# Patient Record
Sex: FEMALE | Race: WHITE | NOT HISPANIC OR LATINO
[De-identification: names, ages, dates, MRNs, and addresses within clinical notes are randomized per-mention and may not be internally consistent; named-entity substitution may affect disease eponyms.]

---

## 2018-04-12 ENCOUNTER — RX RENEWAL (OUTPATIENT)
Age: 68
End: 2018-04-12

## 2018-04-12 PROBLEM — Z00.00 ENCOUNTER FOR PREVENTIVE HEALTH EXAMINATION: Status: ACTIVE | Noted: 2018-04-12

## 2018-11-02 ENCOUNTER — RECORD ABSTRACTING (OUTPATIENT)
Age: 68
End: 2018-11-02

## 2018-11-02 DIAGNOSIS — Z82.49 FAMILY HISTORY OF ISCHEMIC HEART DISEASE AND OTHER DISEASES OF THE CIRCULATORY SYSTEM: ICD-10-CM

## 2018-11-02 DIAGNOSIS — Z83.3 FAMILY HISTORY OF DIABETES MELLITUS: ICD-10-CM

## 2018-11-02 RX ORDER — INSULIN ASPART 100 [IU]/ML
INJECTION, SOLUTION INTRAVENOUS; SUBCUTANEOUS
Refills: 0 | Status: ACTIVE | COMMUNITY

## 2018-11-02 RX ORDER — INSULIN GLARGINE 100 [IU]/ML
INJECTION, SOLUTION SUBCUTANEOUS
Refills: 0 | Status: ACTIVE | COMMUNITY

## 2018-11-26 DIAGNOSIS — Z86.39 PERSONAL HISTORY OF OTHER ENDOCRINE, NUTRITIONAL AND METABOLIC DISEASE: ICD-10-CM

## 2018-11-26 DIAGNOSIS — Z86.79 PERSONAL HISTORY OF OTHER DISEASES OF THE CIRCULATORY SYSTEM: ICD-10-CM

## 2018-11-26 DIAGNOSIS — Z87.440 PERSONAL HISTORY OF URINARY (TRACT) INFECTIONS: ICD-10-CM

## 2018-11-26 DIAGNOSIS — Z87.19 PERSONAL HISTORY OF OTHER DISEASES OF THE DIGESTIVE SYSTEM: ICD-10-CM

## 2018-11-30 ENCOUNTER — APPOINTMENT (OUTPATIENT)
Dept: CARDIOLOGY | Facility: CLINIC | Age: 68
End: 2018-11-30
Payer: MEDICARE

## 2018-11-30 VITALS
HEART RATE: 55 BPM | HEIGHT: 60 IN | WEIGHT: 125 LBS | DIASTOLIC BLOOD PRESSURE: 87 MMHG | OXYGEN SATURATION: 99 % | BODY MASS INDEX: 24.54 KG/M2 | TEMPERATURE: 97.9 F | SYSTOLIC BLOOD PRESSURE: 147 MMHG

## 2018-11-30 PROCEDURE — 93000 ELECTROCARDIOGRAM COMPLETE: CPT

## 2018-11-30 PROCEDURE — 99215 OFFICE O/P EST HI 40 MIN: CPT

## 2018-12-08 ENCOUNTER — NON-APPOINTMENT (OUTPATIENT)
Age: 68
End: 2018-12-08

## 2018-12-08 ENCOUNTER — RX RENEWAL (OUTPATIENT)
Age: 68
End: 2018-12-08

## 2018-12-08 RX ORDER — CARVEDILOL 3.12 MG/1
TABLET, FILM COATED ORAL
Refills: 0 | Status: ACTIVE | COMMUNITY

## 2018-12-08 RX ORDER — LISINOPRIL 30 MG/1
TABLET ORAL
Refills: 0 | Status: ACTIVE | COMMUNITY

## 2018-12-08 NOTE — HISTORY OF PRESENT ILLNESS
[FreeTextEntry1] : 68-year-old female\par Routine followup\par "I feel okay."Mrs Carty suffered trauma to her left leg which had limited her ability to exercise. Liz is now back exercising at her gym regularly. She denies chest pain, shortness of breath, palpitations orthopnea or syncope.

## 2018-12-08 NOTE — REVIEW OF SYSTEMS
[Feeling Fatigued] : feeling fatigued [Eyeglasses] : currently wearing eyeglasses [see HPI] : see HPI [Joint Pain] : joint pain [Negative] : Heme/Lymph

## 2018-12-08 NOTE — PHYSICAL EXAM
[Normal Conjunctiva] : the conjunctiva exhibited no abnormalities [Auscultation Breath Sounds / Voice Sounds] : lungs were clear to auscultation bilaterally [Heart Rate And Rhythm] : heart rate and rhythm were normal [Heart Sounds] : normal S1 and S2 [Murmurs] : no murmurs present [Edema] : no peripheral edema present [Bowel Sounds] : normal bowel sounds [Abnormal Walk] : normal gait [Cyanosis, Localized] : no localized cyanosis [] : no rash [Affect] : the affect was normal [FreeTextEntry1] : pleasant

## 2018-12-08 NOTE — DISCUSSION/SUMMARY
[FreeTextEntry1] : Atherosclerotic heart disease\par Atherosclerotic heart disease is stable. In 5/13 multivessel disease and depressed left ventricle systolic function lead to coronary artery bypass graft surgery. The operation consisted of the LIMA-LAD and SVG-OM1. The RCA was small and nondominant. The postoperative course was complicated by sternal wound infection requiring multiple debridement procedures and prolonged antibiotic therapy. A 7/17 stress sestamibi study was normal without any evidence of myocardial ischemia or infarction. The left ventricular ejection fraction was 69%. In view of the lack of symptoms, above noninvasive studies and clinical course continued medical management is indicated.\par \par I have recommended the following:\par Risk factor modification\par 2 continue the present medical regimen 3 echocardiogram with next office evaluation in 6 months.\par \par \par \par Cardiomyopathy.\par  The working diagnosis is a dilated ischemic-diabetic-hypertensive cardiomyopathy with resultant congestive heart failure now resolved. In 5/13  Liz  presented with congestive heart failure/pleural effusions. Severe left ventricular systolic dysfunction was reflected by a left ventricular ejection fraction of 30% on a 5/13 preoperative echocardiogram and 23% on intraoperative transesophageal echo cardiographic study. Subsequent to the 5/13 coronary artery bypass graft surgery there is marked improvement in left ventricular systolic performance. An  11/16 echocardiogram demonstrated a left ventricle ejection fraction of 68% with a normal end-diastolic dimension of 4.1 cm. The 7/17 gated sestamibi study revealed an ejection fraction of 69%. The present cardiac physical examination is indicative of a euvolemic state [place] Heart Association class I. In view of the lack of symptoms, absence of heart failure clinical course continuing  the present medical management is indicated.\par \par I have recommended the following:\par 1 no further cardiac testing for this problem at this time\par 2 echocardiogram with next office evaluation in 6 months\par \par \par \par Hypertension\par Hypertension has been controlled on the present medical regimen.  Elevation of the blood pressure on initial examination today ( 147/87) may in part be related to a white coat effect.  In the setting of atherosclerotic  heart disease diabetes and prior left ventricular systolic dysfunction ACE-I and beta blocker therapy are attractive\par \par I recommended the following:\par 1 Continue present medical regimen\par 2 home blood pressure monitoring\par \par \par \par Valvular heart disease\par The 11/16 echocardiogram demonstrated mild-moderate mitral insufficiency and mild aortic insufficiency. The left ventricular ejection fraction was 60%. The present cardiac physical examination is not suggestive of severe mitral/aortic regurgitation. In view of the lack of symptoms, absence of heart failure and clinical course continuing monitoring without intervention is indicated at this time\par \par I have recommended the following:\par 1 no further cardiac testing for this problem at this time\par \par \par \par Hyperlipidemia \par Hyperlipidemia represents a risk factor for progressive atherosclerotic disease. The target LDL level with known atherosclerotic heart disease is about 70. HMG co-a reductase inhibitor therapy has been effective. In 11/18 the serum cholesterol level was 135 triglycerides 54 HDL 73 and LDL 61. Nonpharmacological therapy, specifically diet and exercise are emphasized as major aspects of treatment\par \par \par I have recommended the following:\par 1 low-fat low-cholesterol diabetic diet. Regular aerobic exercise\par 2 continue present medical regimen\par 3 target LDL level to about 70 as discussed above

## 2019-01-24 ENCOUNTER — RX RENEWAL (OUTPATIENT)
Age: 69
End: 2019-01-24

## 2019-03-11 ENCOUNTER — APPOINTMENT (OUTPATIENT)
Dept: ENDOCRINOLOGY | Facility: CLINIC | Age: 69
End: 2019-03-11
Payer: MEDICARE

## 2019-03-11 VITALS
HEIGHT: 63 IN | WEIGHT: 125 LBS | BODY MASS INDEX: 22.15 KG/M2 | HEART RATE: 58 BPM | SYSTOLIC BLOOD PRESSURE: 120 MMHG | DIASTOLIC BLOOD PRESSURE: 70 MMHG

## 2019-03-11 PROCEDURE — 99203 OFFICE O/P NEW LOW 30 MIN: CPT

## 2019-03-11 NOTE — HISTORY OF PRESENT ILLNESS
[FreeTextEntry1] : March 11, 2019\par \par PCP:  Dr. Castillo  \par          Gyn:     to see\par          GI:  Dr. Moshe Mann\par          Card:  Dr. Felix Carlson   (Hx MI - double bypass by Dr. Lopez) \par \par CC:  Diabetes    205 lbs - 125 lbs)\par \par 69 yo  mother of two sons (35 and 33), does some consignment work, accompanied by her \par Diagnosed with diabetes when she had MI, blood glucose was 300 mg/dl.\par Managed to lose weight from over 200 to 125 lbs.\par \par \par \par \par Medications include\par Lantus Solo Star  3 units in PM    and \par \par Carvedilol 25 mg daily\par Lisinopril\par Amloipine\par Atovastatin\par Ketorolac\par Ecotrin ASA\par Vit D 400 daily\par \par Impression:  Because of her attention to diet, activity, she has lost considerable weight and gotten her glucose under control.  \par It is very likely she will be able to stop the insulin.\par She will first verify excellent control by checking after meal blood sugars.\par She is not interested in CGM.  \par  \par

## 2019-03-11 NOTE — PHYSICAL EXAM
[Alert] : alert [No Acute Distress] : no acute distress [Well Nourished] : well nourished [Well Developed] : well developed [Healthy Appearance] : healthy appearance [Normal Voice/Communication] : normal voice communication [Normal Sclera/Conjunctiva] : normal sclera/conjunctiva [Normal Outer Ear/Nose] : the ears and nose were normal in appearance [No Stigmata of Cushings Syndrome] : no stigmata of cushings syndrome [Normal Gait] : normal gait [Oriented x3] : oriented to person, place, and time [Normal Insight/Judgement] : insight and judgment were intact [Normal Affect] : the affect was normal [Normal Mood] : the mood was normal

## 2019-05-13 ENCOUNTER — APPOINTMENT (OUTPATIENT)
Dept: ENDOCRINOLOGY | Facility: CLINIC | Age: 69
End: 2019-05-13

## 2019-05-28 ENCOUNTER — APPOINTMENT (OUTPATIENT)
Dept: CARDIOLOGY | Facility: CLINIC | Age: 69
End: 2019-05-28
Payer: MEDICARE

## 2019-05-28 PROCEDURE — 93306 TTE W/DOPPLER COMPLETE: CPT

## 2019-05-31 ENCOUNTER — APPOINTMENT (OUTPATIENT)
Dept: CARDIOLOGY | Facility: CLINIC | Age: 69
End: 2019-05-31
Payer: MEDICARE

## 2019-05-31 VITALS
SYSTOLIC BLOOD PRESSURE: 139 MMHG | BODY MASS INDEX: 23.41 KG/M2 | OXYGEN SATURATION: 100 % | HEIGHT: 61 IN | TEMPERATURE: 98.1 F | DIASTOLIC BLOOD PRESSURE: 71 MMHG | WEIGHT: 124 LBS | HEART RATE: 52 BPM

## 2019-05-31 PROCEDURE — 99215 OFFICE O/P EST HI 40 MIN: CPT

## 2019-05-31 PROCEDURE — 93000 ELECTROCARDIOGRAM COMPLETE: CPT

## 2019-06-01 ENCOUNTER — NON-APPOINTMENT (OUTPATIENT)
Age: 69
End: 2019-06-01

## 2019-06-01 RX ORDER — COLD-HOT PACK
EACH MISCELLANEOUS
Refills: 0 | Status: ACTIVE | COMMUNITY

## 2019-06-01 RX ORDER — ASCORBIC ACID 500 MG
TABLET ORAL
Refills: 0 | Status: ACTIVE | COMMUNITY

## 2019-06-01 RX ORDER — CHOLECALCIFEROL (VITAMIN D3) 25 MCG
TABLET ORAL
Refills: 0 | Status: ACTIVE | COMMUNITY

## 2019-06-01 NOTE — DISCUSSION/SUMMARY
[FreeTextEntry1] : Atherosclerotic heart disease\par Atherosclerotic heart disease is stable. In 5/13 multivessel disease and depressed left ventricle systolic function lead to coronary artery bypass graft surgery. The operation consisted of the LIMA-LAD and SVG-OM1. The RCA was small and nondominant. The postoperative course was complicated by sternal wound infection requiring multiple debridement procedures and prolonged antibiotic therapy. A 7/17 stress sestamibi study was normal without any evidence of myocardial ischemia or infarction. The left ventricular ejection fraction was 69%. In view of the lack of symptoms, above noninvasive studies and clinical course continued medical management is indicated.\par \par I have recommended the following:\par 1 Risk factor modification\par 2. Continue the present medical regimen\par \par \par \par \par Cardiomyopathy.\par  The working diagnosis is a dilated ischemic-diabetic-hypertensive cardiomyopathy with resultant congestive heart failure now resolved. In 5/13  Liz  presented with congestive heart failure/pleural effusions. Severe left ventricular systolic dysfunction was reflected by a left ventricular ejection fraction of 30% on a 5/13 preoperative echocardiogram and 23% on intraoperative transesophageal echo cardiographic study. Subsequent to the 5/13 coronary artery bypass graft surgery there is marked improvement in left ventricular systolic performance.  The 7/17 gated sestamibi study revealed an ejection fraction of 69%.  The 5/19 transthoracic echocardiogram demonstrated a normal left ventricular end-diastolic dimension of 4.5 cm and a left ventricular ejection fraction of 60-65% The present cardiac physical examination is indicative of a euvolemic state [place] Heart Association class I. In view of the lack of symptoms, absence of heart failure clinical course continuing  the present medical management is indicated.\par \par I have recommended the following:\par 1 no further cardiac testing for this problem at this time\par 2.Continue the present medical regimen\par \par \par \par \par Hypertension\par Hypertension has been controlled on the present medical regimen.   In the setting of atherosclerotic  heart disease diabetes and prior left ventricular systolic dysfunction ACE-I and beta blocker therapy are attractive\par \par I recommended the following:\par 1 Continue present medical regimen\par 2 home blood pressure monitoring\par \par \par \par Valvular heart disease\par The 5/19  echocardiogram demonstrated mild-moderate mitral insufficiency and mild aortic sclerosis.. The left ventricular ejection fraction was 60 - 65%.The present cardiac physical examination is not suggestive of severe mitral/aortic regurgitation. In view of the lack of symptoms, absence of heart failure and clinical course continuing monitoring without intervention is indicated at this time\par \par I have recommended the following:\par 1 no further cardiac testing for this problem at this time\par \par \par \par Hyperlipidemia \par Hyperlipidemia represents a risk factor for progressive atherosclerotic disease. The target LDL level with known atherosclerotic heart disease is about 70. HMG co-a reductase inhibitor therapy has been effective. In 11/18 the serum cholesterol level was 135 triglycerides 54 HDL 73 and LDL 61. Nonpharmacological therapy, specifically diet and exercise are emphasized as major aspects of treatment\par \par \par I have recommended the following:\par 1 low-fat low-cholesterol diabetic diet. Regular aerobic exercise\par 2 continue present medical regimen\par 3 target LDL level to about 70 as discussed above

## 2019-06-01 NOTE — HISTORY OF PRESENT ILLNESS
[FreeTextEntry1] : 68-year-old female\par Routine followup\par "I feel great." Liz denies chest pain, palpitations, shortness of breath or syncope. She is physically active exercising regularly without restriction or limitation.\par \par Mrs. Carty is accompanied today by her

## 2019-12-04 ENCOUNTER — APPOINTMENT (OUTPATIENT)
Dept: CARDIOLOGY | Facility: CLINIC | Age: 69
End: 2019-12-04

## 2020-02-03 ENCOUNTER — NON-APPOINTMENT (OUTPATIENT)
Age: 70
End: 2020-02-03

## 2020-02-03 ENCOUNTER — APPOINTMENT (OUTPATIENT)
Dept: CARDIOLOGY | Facility: CLINIC | Age: 70
End: 2020-02-03
Payer: MEDICARE

## 2020-02-03 VITALS
HEART RATE: 52 BPM | WEIGHT: 127 LBS | BODY MASS INDEX: 24 KG/M2 | OXYGEN SATURATION: 100 % | SYSTOLIC BLOOD PRESSURE: 130 MMHG | DIASTOLIC BLOOD PRESSURE: 70 MMHG

## 2020-02-03 DIAGNOSIS — Z86.69 PERSONAL HISTORY OF OTHER DISEASES OF THE NERVOUS SYSTEM AND SENSE ORGANS: ICD-10-CM

## 2020-02-03 PROCEDURE — 99214 OFFICE O/P EST MOD 30 MIN: CPT

## 2020-02-03 PROCEDURE — 93000 ELECTROCARDIOGRAM COMPLETE: CPT

## 2020-02-04 PROBLEM — Z86.69: Status: RESOLVED | Noted: 2020-02-04 | Resolved: 2020-02-04

## 2020-02-04 NOTE — HISTORY OF PRESENT ILLNESS
[FreeTextEntry1] : 69-year-old female\par Routine followup\par "I feel good." Liz has recovered from a foot fracture. Mrs. Carty has restarted her exercise program. No chest pain. No shortness of breath at rest . No palpitations. Blood pressure levels have been normal No syncope. Her main complaint today is that of nasal/sinus congestion. \par \par Liz is accompanied today by her .

## 2020-02-04 NOTE — DISCUSSION/SUMMARY
[FreeTextEntry1] : Atherosclerotic heart disease\par Liz has known atherosclerotic heart disease. In 5/13 multivessel disease and depressed left ventricle systolic function lead to coronary artery bypass graft surgery. The operation consisted of the LIMA-LAD and SVG-OM1. The RCA was small and nondominant. The postoperative course was complicated by sternal wound infection requiring multiple debridement procedures and prolonged antibiotic therapy. A 7/17 stress sestamibi study was normal without any evidence of myocardial ischemia or infarction. The left ventricular ejection fraction was 69%. In view of the lack of symptoms, above noninvasive studies and clinical course continued medical management is indicated.\par \par I have recommended the following:\par 1 Risk factor modification\par 2. Continue the present medical regimen\par \par \par \par \par Cardiomyopathy.\par  The working diagnosis is a dilated ischemic-diabetic-hypertensive cardiomyopathy with resultant congestive heart failure now resolved. In 5/13  Liz  presented with congestive heart failure/pleural effusions. Severe left ventricular systolic dysfunction was reflected by a left ventricular ejection fraction of 30% on a 5/13 preoperative echocardiogram and 23% on intraoperative transesophageal echo cardiographic study. Subsequent to the 5/13 coronary artery bypass graft surgery there is marked improvement in left ventricular systolic performance.  The 7/17 gated sestamibi study revealed an ejection fraction of 69%.  The 5/19 transthoracic echocardiogram demonstrated a normal left ventricular end-diastolic dimension of 4.5 cm and a left ventricular ejection fraction of 60-65% The present cardiac physical examination is indicative of a euvolemic state [place] Heart Association class I. In view of the lack of symptoms, absence of heart failure clinical course continuing  the present medical management is indicated.\par \par I have recommended the following:\par 1 no further cardiac testing for this problem at this time\par 2.Continue the present medical regimen\par \par \par \par \par Hypertension\par Hypertension has been controlled on the present medical regimen.   In the setting of atherosclerotic  heart disease diabetes and prior left ventricular systolic dysfunction ACE-I and beta blocker therapy are attractive\par \par I recommended the following:\par 1 Continue present medical regimen\par 2 home blood pressure monitoring\par \par \par \par Valvular heart disease\par The 5/19  echocardiogram demonstrated mild-moderate mitral insufficiency and mild aortic sclerosis.. The left ventricular ejection fraction was 60 - 65%.The present cardiac physical examination is not suggestive of severe mitral/aortic regurgitation. In view of the lack of symptoms, absence of heart failure and clinical course continuing monitoring without intervention is indicated at this time\par \par I have recommended the following:\par 1 no further cardiac testing for this problem at this time\par \par \par \par Hyperlipidemia \par Hyperlipidemia represents a risk factor for progressive atherosclerotic disease. The target LDL level with known atherosclerotic heart disease is about 70. HMG co-a reductase inhibitor therapy has been effective. In 11/19 the serum cholesterol level was 130 triglycerides 64 HDL 55  and LDL 61. Nonpharmacological therapy, specifically diet and exercise are emphasized as major aspects of treatment\par \par \par I have recommended the following:\par 1 low-fat low-cholesterol diabetic diet. Regular aerobic exercise\par 2 continue present medical regimen\par 3 target LDL level to about 70 as discussed above\par \par \par \par The diagnosis, prognosis, risks options alternatives were explained at length to the patient and her family. All questions were answered. Issues discussed included atherosclerotic heart disease, congestive heart failure hypertension and hyperlipidemia.\par \par \par Counseling and/or coordination of care\par Time was a significant factor for this patient encounter. Total time spent with the patient was 30 minutes. 50% of the time was devoted towards counseling and/or coordination of care.

## 2020-02-04 NOTE — REVIEW OF SYSTEMS
[Eyeglasses] : currently wearing eyeglasses [Feeling Fatigued] : feeling fatigued [Joint Pain] : joint pain [see HPI] : see HPI [Negative] : Heme/Lymph [Sinus Pressure] : sinus pressure

## 2020-07-15 ENCOUNTER — APPOINTMENT (OUTPATIENT)
Dept: CARDIOLOGY | Facility: CLINIC | Age: 70
End: 2020-07-15
Payer: MEDICARE

## 2020-07-15 VITALS
TEMPERATURE: 98.9 F | DIASTOLIC BLOOD PRESSURE: 80 MMHG | HEART RATE: 64 BPM | WEIGHT: 124.25 LBS | SYSTOLIC BLOOD PRESSURE: 120 MMHG | OXYGEN SATURATION: 98 % | BODY MASS INDEX: 23.48 KG/M2

## 2020-07-15 PROCEDURE — 99214 OFFICE O/P EST MOD 30 MIN: CPT

## 2020-07-15 NOTE — REVIEW OF SYSTEMS
[Sinus Pressure] : sinus pressure [Feeling Fatigued] : feeling fatigued [Eyeglasses] : currently wearing eyeglasses [Joint Pain] : joint pain [see HPI] : see HPI [Negative] : Heme/Lymph

## 2020-07-16 NOTE — DISCUSSION/SUMMARY
[FreeTextEntry1] : Atherosclerotic heart disease\par Liz has known atherosclerotic heart disease. In 5/13 multivessel disease and depressed left ventricle systolic function lead to coronary artery bypass graft surgery. The operation consisted of the LIMA-LAD and SVG-OM1. The RCA was small and nondominant. The postoperative course was complicated by sternal wound infection requiring multiple debridement procedures and prolonged antibiotic therapy. A 7/17 stress sestamibi study was normal without any evidence of myocardial ischemia or infarction. The left ventricular ejection fraction was 69%. The 2/20 resting electrocardiogram shows no evidence of myocardial ischemia or infarction. In view of the lack of symptoms, above noninvasive studies and clinical course continued medical management is indicated.\par \par I have recommended the following:\par 1 Risk factor modification\par 2. Continue the present medical regimen\par 3. 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 In 5/13  Liz  presented with congestive heart failure/pleural effusions. Severe left ventricular systolic dysfunction was reflected by a left ventricular ejection fraction of 30% on a 5/13 preoperative echocardiogram and 23% on intraoperative transesophageal echo cardiographic study. Subsequent to the 5/13 coronary artery bypass graft surgery there is marked improvement in left ventricular systolic performance.  The 7/17 gated sestamibi study revealed an ejection fraction of 69%.  The 5/19 transthoracic echocardiogram demonstrated a normal left ventricular end-diastolic dimension of 4.5 cm and a left ventricular ejection fraction of 60-65% The present cardiac physical examination is indicative of a euvolemic state [place] Heart Association class I. In view of the lack of symptoms, absence of heart failure clinical course continuing  the present medical management is indicated.\par \par I have recommended the following:\par 1 no further cardiac testing for this problem at this time\par 2.Continue the present medical regimen\par 3.No further cardiac testing for this problem at this time.\par \par \par \par \par Hypertension\par Hypertension has been controlled on the present medical regimen.   In the setting of atherosclerotic  heart disease diabetes and prior left ventricular systolic dysfunction ACE-I and beta blocker therapy are attractive.\par \par I have recommended the following:\par 1 Continue present medical regimen\par 2 home blood pressure monitoring\par \par \par \par Valvular heart disease\par The 5/19  echocardiogram demonstrated mild-moderate mitral insufficiency and mild aortic sclerosis.. The left ventricular ejection fraction was 60 - 65%.The present cardiac physical examination is not suggestive of severe mitral/aortic regurgitation. In view of the lack of symptoms, absence of heart failure and clinical course continuing monitoring without intervention is indicated at this time\par \par I have recommended the following:\par 1 no further cardiac testing for this problem at this time\par \par \par \par Hyperlipidemia \par Hyperlipidemia represents a risk factor for progressive atherosclerotic disease. The target LDL level with known atherosclerotic heart disease is about 70. HMG co-a reductase inhibitor therapy has been effective. In 11/19 the serum cholesterol level was 130 triglycerides 64 HDL 55  and LDL 61. Nonpharmacological therapy, specifically diet and exercise are emphasized as major aspects of treatment\par \par \par I have recommended the following:\par 1 low-fat low-cholesterol diabetic diet. Regular aerobic exercise\par 2 continue present medical regimen\par 3 target LDL level to about 70 as discussed above\par \par \par \par The diagnosis, prognosis, risks options alternatives were explained at length to the patient and her family. All questions were answered. Issues discussed included atherosclerotic heart disease, congestive heart failure hypertension and hyperlipidemia.\par \par \par Counseling and/or coordination of care\par Time was a significant factor for this patient encounter. Total time spent with the patient was 25 minutes. 50% of the time was devoted towards counseling and/or coordination of care.

## 2020-07-16 NOTE — HISTORY OF PRESENT ILLNESS
[FreeTextEntry1] : 69-year-old female\par Routine followup\par \par "I feel good" Liz denies chest pain, palpitations, shortness of breath or syncope. She is physically active exercising regularly without restriction or limitation.\par \par Mrs. Carty is accompanied today by her .

## 2020-12-30 ENCOUNTER — NON-APPOINTMENT (OUTPATIENT)
Age: 70
End: 2020-12-30

## 2020-12-30 ENCOUNTER — APPOINTMENT (OUTPATIENT)
Dept: CARDIOLOGY | Facility: CLINIC | Age: 70
End: 2020-12-30
Payer: MEDICARE

## 2020-12-30 VITALS
WEIGHT: 127 LBS | SYSTOLIC BLOOD PRESSURE: 178 MMHG | DIASTOLIC BLOOD PRESSURE: 84 MMHG | HEART RATE: 70 BPM | BODY MASS INDEX: 24 KG/M2 | OXYGEN SATURATION: 100 % | TEMPERATURE: 98 F

## 2020-12-30 PROCEDURE — 99215 OFFICE O/P EST HI 40 MIN: CPT

## 2020-12-30 PROCEDURE — 99072 ADDL SUPL MATRL&STAF TM PHE: CPT

## 2020-12-30 PROCEDURE — 93000 ELECTROCARDIOGRAM COMPLETE: CPT

## 2020-12-30 NOTE — REVIEW OF SYSTEMS
[Sinus Pressure] : sinus pressure [Feeling Fatigued] : feeling fatigued [Eyeglasses] : currently wearing eyeglasses [see HPI] : see HPI [Joint Pain] : joint pain [Negative] : Heme/Lymph

## 2020-12-31 RX ORDER — ASPIRIN ENTERIC COATED TABLETS 81 MG 81 MG/1
81 TABLET, DELAYED RELEASE ORAL
Qty: 30 | Refills: 3 | Status: ACTIVE | COMMUNITY
Start: 2020-12-31

## 2020-12-31 RX ORDER — ASPIRIN 325 MG/1
TABLET, FILM COATED ORAL
Refills: 0 | Status: DISCONTINUED | COMMUNITY
End: 2020-12-31

## 2020-12-31 NOTE — HISTORY OF PRESENT ILLNESS
[FreeTextEntry1] : 70-year-old female\par Unscheduled visit\victorino Hill  complains of  symptoms are characterized by upper chest burning sensation with bile-like sensation in her throat. In addition systemic blood pressure levels have been significantly elevated to 170-190 systolic. She has mistakenly been taking aspirin with a nonsteroidal anti-inflammatory agent.   Mrs Carty denies symptoms of angina, shortness of breath at rest palpitations or syncope\par \victorino Hill is accompanied today by her

## 2020-12-31 NOTE — DISCUSSION/SUMMARY
[FreeTextEntry1] : Chest pain\par The working diagnosis is gastroesophageal reflux. The history is compelling for this diagnosis. The differential diagnoses would include myocardial ischemia. However the history and unremarkable resting 12/30/20 electrocardiogram argue against this diagnosis. A 7/17 stress sestamibi study was normal.\par \par I have recommended the following\par a. Protonix 40 mg/day\par \par \par \par \par Atherosclerotic heart disease\par Liz has known atherosclerotic heart disease. In 5/13 multivessel disease and depressed left ventricle systolic function lead to coronary artery bypass graft surgery. The operation consisted of the LIMA-LAD and SVG-OM1. The RCA was small and nondominant. The postoperative course was complicated by sternal wound infection requiring multiple debridement procedures and prolonged antibiotic therapy. A 7/17 stress sestamibi study was normal without any evidence of myocardial ischemia or infarction. The left ventricular ejection fraction was 69%. The 12/20 resting electrocardiogram shows no evidence of myocardial ischemia or infarction. In view of the lack of symptoms, above noninvasive studies and clinical course continued medical management is indicated.\par \par I have recommended the following:\par 1 Risk factor modification\par 2. Continue the present medical regimen\par 3. 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 In 5/13  Liz  presented with congestive heart failure/pleural effusions. Severe left ventricular systolic dysfunction was reflected by a left ventricular ejection fraction of 30% on a 5/13 preoperative echocardiogram and 23% on intraoperative transesophageal echo cardiographic study. Subsequent to the 5/13 coronary artery bypass graft surgery there is marked improvement in left ventricular systolic performance.  The 7/17 gated sestamibi study revealed an ejection fraction of 69%.  The 5/19 transthoracic echocardiogram demonstrated a normal left ventricular end-diastolic dimension of 4.5 cm and a left ventricular ejection fraction of 60-65% The present cardiac physical examination is indicative of a euvolemic state New [place] Heart Association class I. In view of the lack of symptoms, absence of heart failure clinical course continuing  the present medical management is indicated.\par \par I have recommended the following:\par 1 no further cardiac testing for this problem at this time\par 2.Continue the present medical regimen\par 3.No further cardiac testing for this problem at this time.\par \par \par \par \par Hypertension\par Hypertension  is not adequately controlled on the present medical regimen.   In the setting of atherosclerotic  heart disease diabetes and prior left ventricular systolic dysfunction ACE-I and beta blocker therapy are attractive. \par The administration of nonsteroidal anti-inflammatory agents may exacerbate hypertension.\par  The dose of amlodipine may be increased and  aldactone added to the present medical regimen  ( with monitoring of electrolytes and renal function )   in an effort to improve blood pressure levels\par \par I have recommended the following:\par 1 Increase amlodipine dose from 5 mg/day to 5 mg b.i.d.\par 2 .Discontinue nonsteroidal anti-inflammatory agents\par 3 home blood pressure monitoring\par 4 Further antihypertensive medical intervention  ( spironolactone  ) dependent  on  the response to above measures and the patient's clinical course\par \par \par \par Valvular heart disease\par The 5/19  echocardiogram demonstrated mild-moderate mitral insufficiency and mild aortic sclerosis.. The left ventricular ejection fraction was 60 - 65%.The present cardiac physical examination is not suggestive of severe mitral/aortic regurgitation. In view of the lack of symptoms, absence of heart failure and clinical course continuing monitoring without intervention is indicated at this time\par \par I have recommended the following:\par 1 no further cardiac testing for this problem at this time\par \par \par \par Hyperlipidemia \par Hyperlipidemia represents a risk factor for progressive atherosclerotic disease. The target LDL level with known atherosclerotic heart disease is about 70. HMG co-a reductase inhibitor therapy has been effective. In 11/19 the serum cholesterol level was 130 triglycerides 64 HDL 55  and LDL 61. Nonpharmacological therapy, specifically diet and exercise are emphasized as major aspects of treatment\par \par \par I have recommended the following:\par 1 low-fat low-cholesterol diabetic diet. Regular aerobic exercise\par 2 continue present medical regimen\par 3 target LDL level to about 70 as discussed above\par \par \par \par The diagnosis, prognosis, risks options alternatives were explained at length to the patient and her family. All questions were answered. Issues discussed included atherosclerotic heart disease, congestive heart failure hypertension  hyperlipidemia gastroesophageal reflux antihypertensive medications and noninvasive cardiac testing \par \par \par Counseling and/or coordination of care\par Time was a significant factor for this patient encounter. Total time spent with the patient was 40  minutes. 50% of the time was devoted towards counseling and/or coordination of care.

## 2021-01-22 ENCOUNTER — APPOINTMENT (OUTPATIENT)
Dept: CARDIOLOGY | Facility: CLINIC | Age: 71
End: 2021-01-22
Payer: MEDICARE

## 2021-01-22 VITALS
WEIGHT: 128 LBS | HEART RATE: 74 BPM | DIASTOLIC BLOOD PRESSURE: 80 MMHG | OXYGEN SATURATION: 99 % | BODY MASS INDEX: 24.19 KG/M2 | SYSTOLIC BLOOD PRESSURE: 158 MMHG

## 2021-01-22 PROCEDURE — 99214 OFFICE O/P EST MOD 30 MIN: CPT

## 2021-01-22 PROCEDURE — 99072 ADDL SUPL MATRL&STAF TM PHE: CPT

## 2021-01-22 RX ORDER — PANTOPRAZOLE 40 MG/1
40 TABLET, DELAYED RELEASE ORAL DAILY
Qty: 30 | Refills: 3 | Status: ACTIVE | COMMUNITY
Start: 2021-01-22 | End: 1900-01-01

## 2021-01-23 NOTE — DISCUSSION/SUMMARY
[FreeTextEntry1] : Chest pain\par The working diagnosis is gastroesophageal reflux. The history is compelling for this diagnosis.   Symptoms have persisted despite the administration of pantoprazole.  The differential diagnoses would include myocardial ischemia. However the history and unremarkable resting 12/30/20 electrocardiogram argue against this diagnosis. A 7/17 stress sestamibi study was normal.\par \par I have recommended the following\par a.  continue Protonix 40 mg/day\par b. Addition of famotidine 20 mg/day\par c  Await gastroenterology evaluation Dr. CANDICE Zaldivar  with anticipation of upper endoscopy\par \par \par \par \par Atherosclerotic heart disease\par Liz has known atherosclerotic heart disease. In 5/13 multivessel disease and depressed left ventricle systolic function lead to coronary artery bypass graft surgery. The operation consisted of the LIMA-LAD and SVG-OM1. The RCA was small and nondominant. The postoperative course was complicated by sternal wound infection requiring multiple debridement procedures and prolonged antibiotic therapy. A 7/17 stress sestamibi study was normal without any evidence of myocardial ischemia or infarction. The left ventricular ejection fraction was 69%. The 12/20 resting electrocardiogram shows no evidence of myocardial ischemia or infarction. In view of the lack of symptoms, above noninvasive studies and clinical course continued medical management is indicated.\par \par I have recommended the following:\par 1 Risk factor modification\par 2. Continue the present medical regimen\par 3. 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 In 5/13  Liz  presented with congestive heart failure/pleural effusions. Severe left ventricular systolic dysfunction was reflected by a left ventricular ejection fraction of 30% on a 5/13 preoperative echocardiogram and 23% on intraoperative transesophageal echo cardiographic study. Subsequent to the 5/13 coronary artery bypass graft surgery there is marked improvement in left ventricular systolic performance.  The 7/17 gated sestamibi study revealed an ejection fraction of 69%.  The 5/19 transthoracic echocardiogram demonstrated a normal left ventricular end-diastolic dimension of 4.5 cm and a left ventricular ejection fraction of 60-65% The present cardiac physical examination is indicative of a euvolemic state [place] Heart Association class I. In view of the lack of symptoms, absence of heart failure clinical course continuing  the present medical management is indicated.\par \par I have recommended the following:\par 1 no further cardiac testing for this problem at this time\par 2.Continue the present medical regimen\par 3.No further cardiac testing for this problem at this time.\par 4 Anticipate echocardiogram later 2021  or 2022\par \par \par \par \par Hypertension\par Hypertension  is controlled on the present medical regimen.   In the setting of atherosclerotic  heart disease diabetes and prior left ventricular systolic dysfunction ACE-I and beta blocker therapy are attractive. \par  The dose of amlodipine may be increased and  aldactone added to the present medical regimen  ( with monitoring of electrolytes and renal function )   if required to maintain optimal blood pressure levels.\par \par I have recommended the following:\par 1 Continue the present medical regimen\par 2 home blood pressure monitoring\par 3 Increase amlodipine dose and/or  addition of spironolactone if required to maintain optimal levels as discussed above\par \par \par \par Valvular heart disease\par The 5/19  echocardiogram demonstrated mild-moderate mitral insufficiency and mild aortic sclerosis.. The left ventricular ejection fraction was 60 - 65%.The present cardiac physical examination is not suggestive of severe mitral/aortic regurgitation. In view of the lack of symptoms, absence of heart failure and clinical course continuing monitoring without intervention is indicated at this time\par \par I have recommended the following:\par 1 no further cardiac testing for this problem at this time\par \par \par \par Hyperlipidemia \par Hyperlipidemia represents a risk factor for progressive atherosclerotic disease. The target LDL level with known atherosclerotic heart disease is about 70. HMG co-a reductase inhibitor therapy has been effective. In 11/19 the serum cholesterol level was 130 triglycerides 64 HDL 55  and LDL 61. Nonpharmacological therapy, specifically diet and exercise are emphasized as major aspects of treatment\par \par \par I have recommended the following:\par 1 low-fat low-cholesterol diabetic diet. Regular aerobic exercise\par 2 continue present medical regimen\par 3 target LDL level to about 70 as discussed above\par 4. Routine laboratory studies including lipid profile through primary care Dr. Castillo\par \par \par \par The diagnosis, prognosis, risks options alternatives were explained at length to the patient and her family. All questions were answered. Issues discussed included atherosclerotic heart disease, congestive heart failure hypertension  hyperlipidemia gastroesophageal reflux antihypertensive medications and noninvasive cardiac testing .   Increased risk regarding medical decision making for this patient encounter included evaluation of a  patient with known atherosclerotic heart disease and chest pain.\par \par \par Counseling and/or coordination of care\par Time was a significant factor for this patient encounter. Total time spent with the patient was  30   minutes. 50% of the time was devoted towards counseling and/or coordination of care.

## 2021-01-23 NOTE — HISTORY OF PRESENT ILLNESS
[FreeTextEntry1] : 70-year-old female\par Routine followup\par Main complaint is that of chest burning at night while lying down. Despite the administration of pantoprazole  and a wedge to elevate the head  the  symptoms have persisted. No exertional chest pain. No shortness of breath. No restriction on her ability to exercise/walk.  A gastroenterology consultation is pending\par \par \par  Blood pressure levels have been normal. (See attached home monitoring report.)\par \par \par Liz is accompanied today by her

## 2021-05-19 RX ORDER — ATORVASTATIN CALCIUM 20 MG/1
20 TABLET, FILM COATED ORAL DAILY
Qty: 90 | Refills: 3 | Status: ACTIVE | COMMUNITY
Start: 2021-05-19 | End: 1900-01-01

## 2021-07-20 ENCOUNTER — NON-APPOINTMENT (OUTPATIENT)
Age: 71
End: 2021-07-20

## 2021-07-20 ENCOUNTER — APPOINTMENT (OUTPATIENT)
Dept: CARDIOLOGY | Facility: CLINIC | Age: 71
End: 2021-07-20
Payer: MEDICARE

## 2021-07-20 VITALS
DIASTOLIC BLOOD PRESSURE: 72 MMHG | SYSTOLIC BLOOD PRESSURE: 158 MMHG | HEART RATE: 68 BPM | OXYGEN SATURATION: 97 % | WEIGHT: 134 LBS | BODY MASS INDEX: 25.32 KG/M2

## 2021-07-20 DIAGNOSIS — Z87.19 PERSONAL HISTORY OF OTHER DISEASES OF THE DIGESTIVE SYSTEM: ICD-10-CM

## 2021-07-20 DIAGNOSIS — Z86.39 PERSONAL HISTORY OF OTHER ENDOCRINE, NUTRITIONAL AND METABOLIC DISEASE: ICD-10-CM

## 2021-07-20 PROCEDURE — 99213 OFFICE O/P EST LOW 20 MIN: CPT

## 2021-07-20 PROCEDURE — 93000 ELECTROCARDIOGRAM COMPLETE: CPT

## 2021-07-20 PROCEDURE — 99072 ADDL SUPL MATRL&STAF TM PHE: CPT

## 2021-07-20 NOTE — REVIEW OF SYSTEMS
[Feeling Fatigued] : feeling fatigued [Joint Pain] : joint pain [Negative] : Psychiatric [FreeTextEntry3] : glasses [FreeTextEntry5] : see history of present illness

## 2021-07-20 NOTE — HISTORY OF PRESENT ILLNESS
[FreeTextEntry1] : 70-year-old female\par Routine followup\par \par Liz fell down a flight of stairs and had extensive trauma to her  ribs  arm  and shoulder. The humerus was fractured. She has been treated conservatively without surgery. There were no reports of any cardiovascular complications. Liz denies chest pain, shortness of breath palpitations or syncope.\par \par Mrs. Talamantes is accompanied today by her

## 2021-07-20 NOTE — DISCUSSION/SUMMARY
[FreeTextEntry1] : Atherosclerotic heart disease\par Liz has known atherosclerotic heart disease. In 5/13 multivessel disease and depressed left ventricle systolic function lead to coronary artery bypass graft surgery. The operation consisted of the LIMA-LAD and SVG-OM1. The RCA was small and nondominant. The postoperative course was complicated by sternal wound infection requiring multiple debridement procedures and prolonged antibiotic therapy. A 7/17 stress sestamibi study was normal without any evidence of myocardial ischemia or infarction. The left ventricular ejection fraction was 69%. The  7/21  resting electrocardiogram shows no evidence of myocardial ischemia or infarction. In view of the lack of symptoms, above noninvasive studies and clinical course continued medical management is indicated.\par \par I have recommended the following:\par 1 Risk factor modification\par 2. Continue the present medical regimen\par 3. 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 In 5/13  Liz  presented with congestive heart failure/pleural effusions. Severe left ventricular systolic dysfunction was reflected by a left ventricular ejection fraction of 30% on a 5/13 preoperative echocardiogram and 23% on intraoperative transesophageal echo cardiographic study. Subsequent to the 5/13 coronary artery bypass graft surgery there is marked improvement in left ventricular systolic performance.  The 7/17 gated sestamibi study revealed an ejection fraction of 69%.  The 5/19 transthoracic echocardiogram demonstrated a normal left ventricular end-diastolic dimension of 4.5 cm and a left ventricular ejection fraction of 60-65% The present cardiac physical examination is indicative of a euvolemic state [place] Heart Association class I. In view of the lack of symptoms, absence of heart failure clinical course continuing  the present medical management is indicated.\par \par I have recommended the following:\par 1 no further cardiac testing for this problem at this time\par 2.Continue the present medical regimen\par 3.No further cardiac testing for this problem at this time.\par 4 Anticipate echocardiogram  2022\par \par \par \par \par Hypertension\par Hypertension  has been  controlled on the present medical regimen. Elevation on initial examination today  (158/72 mmHg) is attributed to a white coat effect.  In the setting of atherosclerotic  heart disease diabetes and prior left ventricular systolic dysfunction ACE-I and beta blocker therapy are attractive. \par  The dose of amlodipine may be increased and  aldactone added to the present medical regimen  ( with monitoring of electrolytes and renal function )   if required to maintain optimal blood pressure levels.\par \par I have recommended the following:\par 1 Continue the present medical regimen\par 2 home blood pressure monitoring\par 3 Increase amlodipine dose and/or  addition of spironolactone if required to maintain optimal levels as discussed above\par \par \par \par \par Valvular heart disease\par The 5/19  echocardiogram demonstrated mild-moderate mitral insufficiency and mild aortic sclerosis.. The left ventricular ejection fraction was 60 - 65%.The present cardiac physical examination is not suggestive of severe mitral/aortic regurgitation. In view of the lack of symptoms, absence of heart failure and clinical course continuing monitoring without intervention is indicated at this time\par \par I have recommended the following:\par 1 no further cardiac testing for this problem at this time\par 2 anticipate echocardiogram 2022\par \par \par \par Hyperlipidemia \par Hyperlipidemia represents a risk factor for progressive atherosclerotic disease. The target LDL level with known atherosclerotic heart disease is about 70. HMG co-a reductase inhibitor therapy has been effective. In 11/19 the serum cholesterol level was 130 triglycerides 64 HDL 55  and LDL 61. More recent lipid levels are not available at this time for review Nonpharmacological therapy, specifically diet and exercise are emphasized as major aspects of treatment\par \par \par I have recommended the following:\par 1 low-fat low-cholesterol diabetic diet. Regular aerobic exercise\par 2 continue present medical regimen\par 3 target LDL level to about 70 as discussed above\par 4. Routine laboratory studies including lipid profile through primary care Dr. Castillo\par \par \par \par The diagnosis, prognosis, risks options alternatives were explained at length to the patient and her family. All questions were answered. Issues discussed included atherosclerotic heart disease, congestive heart failure hypertension  hyperlipidemia gastroesophageal reflux antihypertensive medications and noninvasive cardiac testing . \par \par  \par \par \par Counseling and/or coordination of care\par Time was a significant factor for this patient encounter. Total time spent with the patient was  25   minutes. 50% of the time was devoted towards counseling and/or coordination of care.

## 2021-12-23 ENCOUNTER — APPOINTMENT (OUTPATIENT)
Dept: CARDIOLOGY | Facility: CLINIC | Age: 71
End: 2021-12-23
Payer: MEDICARE

## 2021-12-23 PROCEDURE — 99441: CPT

## 2021-12-27 ENCOUNTER — APPOINTMENT (OUTPATIENT)
Dept: CARDIOLOGY | Facility: CLINIC | Age: 71
End: 2021-12-27
Payer: MEDICARE

## 2021-12-27 VITALS
OXYGEN SATURATION: 100 % | BODY MASS INDEX: 25.89 KG/M2 | HEART RATE: 67 BPM | DIASTOLIC BLOOD PRESSURE: 68 MMHG | WEIGHT: 137 LBS | SYSTOLIC BLOOD PRESSURE: 152 MMHG

## 2021-12-27 PROCEDURE — 93000 ELECTROCARDIOGRAM COMPLETE: CPT

## 2021-12-27 PROCEDURE — 99214 OFFICE O/P EST MOD 30 MIN: CPT

## 2021-12-28 NOTE — DISCUSSION/SUMMARY
[FreeTextEntry1] : Chest pain\par The working diagnosis is musculoskeletal chest pain. The history is consistent with this diagnosis. The differential diagnoses would include myocardial ischemia/atherosclerotic heart disease. The patient has an extensive prior history of known ischemic heart disease including previous coronary artery bypass graft surgery. However, the resting 12/21 electrocardiogram is unchanged from prior tracings. The history is not highly suggestive of reflux esophagitis. Noninvasive cardiac studies would be helpful for further evaluation\par \par I have recommended the following\par a. Reassurance\par b. Await gastrointestinal evaluation\par c. Echocardiogram \par d. Nuclear stress test\par \par \par \par Atherosclerotic heart disease\par Liz has known atherosclerotic heart disease. In 5/13 multivessel disease and depressed left ventricle systolic function lead to coronary artery bypass graft surgery. The operation consisted of the LIMA-LAD and SVG-OM1. The RCA was small and nondominant. The postoperative course was complicated by sternal wound infection requiring multiple debridement procedures and prolonged antibiotic therapy. A 7/17 stress sestamibi study was normal without any evidence of myocardial ischemia or infarction. The left ventricular ejection fraction was 69%. The  12/21  resting electrocardiogram shows no evidence of myocardial ischemia or infarction.   In view of the present symptoms a noninvasive reevaluation would be helpful for management decisions.\par \par I have recommended the following:\par 1 Risk factor modification\par 2. Continue the present medical regimen\par 3.  Echocardiogram\par 4. 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 In 5/13  Liz  presented with congestive heart failure/pleural effusions. Severe left ventricular systolic dysfunction was reflected by a left ventricular ejection fraction of 30% on a 5/13 preoperative echocardiogram and 23% on intraoperative transesophageal echo cardiographic study. Subsequent to the 5/13 coronary artery bypass graft surgery there is marked improvement in left ventricular systolic performance.  The 7/17 gated sestamibi study revealed an ejection fraction of 69%.  The 5/19 transthoracic echocardiogram demonstrated a normal left ventricular end-diastolic dimension of 4.5 cm and a left ventricular ejection fraction of 60-65% The present cardiac physical examination is indicative of a euvolemic state New [place] Heart Association class I. In view of the lack of symptoms, absence of heart failure clinical course continuing  the present medical management is indicated.\par \par I have recommended the following:\par 1 no further cardiac testing for this problem at this time\par 2.Continue the present medical regimen\par 3 Echocardiogram  \par \par \par \par \par Hypertension\par Hypertension  has been  controlled on the present medical regimen. Elevation on initial examination today  (152/68 mmHg) is attributed to a white coat effect.  In the setting of atherosclerotic  heart disease diabetes and prior left ventricular systolic dysfunction ACE-I and beta blocker therapy are attractive. \par  The dose of amlodipine may be increased and  aldactone added to the present medical regimen  ( with monitoring of electrolytes and renal function )   if required to maintain optimal blood pressure levels.\par \par I have recommended the following:\par 1 Continue the present medical regimen\par 2 home blood pressure monitoring\par 3 Increase amlodipine dose and/or  addition of spironolactone if required to maintain optimal levels as discussed above\par \par \par \par \par Valvular heart disease\par The 5/19  echocardiogram demonstrated mild-moderate mitral insufficiency and mild aortic sclerosis.. The left ventricular ejection fraction was 60 - 65%.The present cardiac physical examination is not suggestive of severe mitral/aortic regurgitation. In view of the lack of symptoms, absence of heart failure and clinical course continuing monitoring without intervention is indicated at this time\par \par I have recommended the following:\par 1 no further cardiac testing for this problem at this time\par 2 Echocardiogram \par \par \par \par Hyperlipidemia \par Hyperlipidemia represents a risk factor for progressive atherosclerotic disease. The target LDL level with known atherosclerotic heart disease is about 70. HMG co-a reductase inhibitor therapy has been effective. In 9/21 the serum cholesterol level was 138  triglycerides 89  HDL 65   and LDL 56. Nonpharmacological therapy, specifically diet and exercise are emphasized as major aspects of treatment\par \par \par I have recommended the following:\par 1 low-fat low-cholesterol diabetic diet. Regular aerobic exercise\par 2 continue present medical regimen\par 3 target LDL level to about 70 as discussed above\par 4. Routine laboratory studies including lipid profile through primary care Dr. Castillo\par \par \par \par The diagnosis, prognosis, risks options alternatives were explained at length to the patient and her family. All questions were answered. Issues discussed included  chest pain atherosclerotic heart disease, congestive heart failure hypertension  hyperlipidemia gastroesophageal reflux antihypertensive medications and noninvasive cardiac testing . \par \par  \par \par \par Counseling and/or coordination of care\par Time was a significant factor for this patient encounter. Total time spent with the patient was   30   minutes. 50% of the time was devoted towards counseling and/or coordination of care.

## 2021-12-28 NOTE — HISTORY OF PRESENT ILLNESS
[FreeTextEntry1] : 71-year-old female\par Unscheduled visit\par \par 5-6 day history of chest "tightness" unrelated to exercise and without associated diaphoresis or dyspnea.\par Liz is accompanied today by her

## 2022-01-10 ENCOUNTER — APPOINTMENT (OUTPATIENT)
Dept: CARDIOLOGY | Facility: CLINIC | Age: 72
End: 2022-01-10
Payer: MEDICARE

## 2022-01-10 PROCEDURE — 93306 TTE W/DOPPLER COMPLETE: CPT

## 2022-01-17 ENCOUNTER — NON-APPOINTMENT (OUTPATIENT)
Age: 72
End: 2022-01-17

## 2022-01-17 DIAGNOSIS — Z86.79 PERSONAL HISTORY OF OTHER DISEASES OF THE CIRCULATORY SYSTEM: ICD-10-CM

## 2022-02-24 ENCOUNTER — RESULT REVIEW (OUTPATIENT)
Age: 72
End: 2022-02-24

## 2022-02-27 DIAGNOSIS — Z86.79 PERSONAL HISTORY OF OTHER DISEASES OF THE CIRCULATORY SYSTEM: ICD-10-CM

## 2022-03-04 ENCOUNTER — APPOINTMENT (OUTPATIENT)
Dept: CARDIOLOGY | Facility: CLINIC | Age: 72
End: 2022-03-04
Payer: MEDICARE

## 2022-03-04 VITALS
HEIGHT: 61 IN | WEIGHT: 131 LBS | HEART RATE: 66 BPM | OXYGEN SATURATION: 100 % | SYSTOLIC BLOOD PRESSURE: 152 MMHG | BODY MASS INDEX: 24.73 KG/M2 | DIASTOLIC BLOOD PRESSURE: 74 MMHG

## 2022-03-04 DIAGNOSIS — Z86.79 PERSONAL HISTORY OF OTHER DISEASES OF THE CIRCULATORY SYSTEM: ICD-10-CM

## 2022-03-04 PROCEDURE — 99214 OFFICE O/P EST MOD 30 MIN: CPT

## 2022-03-05 PROBLEM — Z86.79 HISTORY OF CARDIOMYOPATHY: Status: RESOLVED | Noted: 2018-11-26 | Resolved: 2022-03-05

## 2022-03-05 NOTE — REVIEW OF SYSTEMS
[Joint Pain] : joint pain [Negative] : Heme/Lymph [FreeTextEntry5] : see history of present illness [FreeTextEntry3] : glasses

## 2022-03-05 NOTE — DISCUSSION/SUMMARY
[FreeTextEntry1] : Atherosclerotic heart disease\par Atherosclerotic heart disease is stable. In 5/13 multivessel disease and depressed left ventricle systolic function lead to coronary artery bypass graft surgery. The operation consisted of the LIMA-LAD and SVG-OM1. The RCA was small and nondominant. The postoperative course was complicated by sternal wound infection requiring multiple debridement procedures and prolonged antibiotic therapy.  The  12/21  resting electrocardiogram shows no evidence of myocardial ischemia or infarction. A 2/22 stress sestamibi study was normal. The left ventricular ejection fraction was 77%. \par \par In view of the lack of symptoms above noninvasive studies and clinical course continued medical management is indicated at this time. Measures to control modifiable risk factors for the development of atherosclerotic disease will be important in long-term management..\par \par I have recommended the following:\par 1 Risk factor modification\par 2. Continue the present medical regimen\par 3. 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 In 5/13  Liz  presented with congestive heart failure/pleural effusions. Severe left ventricular systolic dysfunction was reflected by a left ventricular ejection fraction of 30% on a 5/13 preoperative echocardiogram and 23% on intraoperative transesophageal echo cardiographic study. Subsequent to the 5/13 coronary artery bypass graft surgery there is marked improvement in left ventricular systolic performance.  .  The  1/22  transthoracic echocardiogram demonstrated a normal left ventricular end-diastolic dimension of 4.4 cm and a left ventricular ejection fraction of 67 %   The 2/22 gated sestamibi ejection fraction was 77%  The present cardiac physical examination is indicative of a euvolemic state [place] Heart Association class I. In view of the lack of symptoms, absence of heart failure  and  clinical course continuing  the present medical management is indicated.\par \par I have recommended the following:\par 1 no further cardiac testing for this problem at this time\par 2.Continue the present medical regimen\par  \par \par \par \par \par Hypertension\par Hypertension  has been  controlled on the present medical regimen. Elevation on initial examination today  (152/ 74 mmHg) is attributed to a white coat effect.  In the setting of atherosclerotic  heart disease diabetes and prior left ventricular systolic dysfunction ACE-I and beta blocker therapy are attractive. \par  The dose of amlodipine may be increased and  aldactone added to the present medical regimen  ( with monitoring of electrolytes and renal function )   if required to maintain optimal blood pressure levels.\par \par I have recommended the following:\par 1 Continue the present medical regimen\par 2 home blood pressure monitoring\par 3 Increase amlodipine dose and/or  addition of spironolactone if required to maintain optimal levels as discussed above\par \par \par \par \par Valvular heart disease\par The  1/22  echocardiogram demonstrated mild  mitral insufficiency and mild aortic sclerosis..The pulmonary artery systolic pressure was normal at 27 mmHg. The left atrial volume index was severely increased at 64 mL/m2. The left ventricular ejection fraction was 67% .The present cardiac physical examination is not suggestive of severe mitral/aortic regurgitation. In view of the lack of symptoms, absence of heart failure and clinical course continuing monitoring without intervention is indicated at this time\par \par I have recommended the following:\par 1 no further cardiac testing for this problem at this time\par  \par \par \par \par Hyperlipidemia \par Hyperlipidemia represents a risk factor for progressive atherosclerotic disease. The target LDL level with known atherosclerotic heart disease is about 70. HMG co-a reductase inhibitor therapy has been effective. In 9/21 the serum cholesterol level was 138  triglycerides 89  HDL 65   and LDL 56. Nonpharmacological therapy, specifically diet and exercise are emphasized as major aspects of treatment\par \par \par I have recommended the following:\par 1 low-fat low-cholesterol diabetic diet. Regular aerobic exercise\par 2 continue present medical regimen\par 3 target LDL level to about 70 as discussed above\par 4. Routine laboratory studies including lipid profile through primary care Dr. Castillo\par \par \par \par The diagnosis, prognosis, risks options alternatives were explained at length to the patient and her family. All questions were answered. Issues discussed included  chest pain atherosclerotic heart disease, congestive heart failure hypertension  hyperlipidemia gastroesophageal reflux antihypertensive medications and noninvasive cardiac testing . \par \par  \par \par \par Counseling and/or coordination of care\par Time was a significant factor for this patient encounter. Total time spent with the patient was   30   minutes. 50% of the time was devoted towards counseling and/or coordination of care.

## 2022-03-05 NOTE — HISTORY OF PRESENT ILLNESS
[FreeTextEntry1] : 71-year-old female\par Routine followup\par "I feel great"  Liz denies chest pain, palpitations, shortness of breath or syncope. Home blood pressure levels have been normal\par \par Liz is accompanied today by her

## 2022-04-01 ENCOUNTER — NON-APPOINTMENT (OUTPATIENT)
Age: 72
End: 2022-04-01

## 2022-08-03 ENCOUNTER — APPOINTMENT (OUTPATIENT)
Dept: CARDIOLOGY | Facility: CLINIC | Age: 72
End: 2022-08-03

## 2022-08-09 ENCOUNTER — NON-APPOINTMENT (OUTPATIENT)
Age: 72
End: 2022-08-09

## 2022-08-23 ENCOUNTER — APPOINTMENT (OUTPATIENT)
Dept: CARDIOLOGY | Facility: CLINIC | Age: 72
End: 2022-08-23

## 2022-08-23 ENCOUNTER — NON-APPOINTMENT (OUTPATIENT)
Age: 72
End: 2022-08-23

## 2022-08-23 VITALS
SYSTOLIC BLOOD PRESSURE: 130 MMHG | WEIGHT: 133 LBS | OXYGEN SATURATION: 96 % | HEART RATE: 67 BPM | BODY MASS INDEX: 25.13 KG/M2 | DIASTOLIC BLOOD PRESSURE: 78 MMHG

## 2022-08-23 PROCEDURE — 93000 ELECTROCARDIOGRAM COMPLETE: CPT

## 2022-08-23 PROCEDURE — 99213 OFFICE O/P EST LOW 20 MIN: CPT | Mod: 25

## 2022-08-24 RX ORDER — AMLODIPINE BESYLATE 5 MG/1
TABLET ORAL
Refills: 0 | Status: ACTIVE | COMMUNITY

## 2022-08-24 NOTE — DISCUSSION/SUMMARY
[FreeTextEntry1] : Atherosclerotic heart disease\par Atherosclerotic heart disease is stable. In 5/13 multivessel disease and depressed left ventricle systolic function lead to coronary artery bypass graft surgery. The operation consisted of the LIMA-LAD and SVG-OM1. The RCA was small and nondominant. The postoperative course was complicated by sternal wound infection requiring multiple debridement procedures and prolonged antibiotic therapy.  . A 2/22 stress sestamibi study was normal. The left ventricular ejection fraction was 77%. The  8/22  resting electrocardiogram is normal  showing  no evidence of myocardial ischemia or infarction\par \par In view of the lack of symptoms above noninvasive studies and clinical course continued medical management is indicated at this time. Measures to control modifiable risk factors for the development of atherosclerotic disease will be important in long-term management..\par \par I have recommended the following:\par 1 Risk factor modification\par 2. Continue the present medical regimen\par 3. 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 In 5/13  Liz  presented with congestive heart failure/pleural effusions. Severe left ventricular systolic dysfunction was reflected by a left ventricular ejection fraction of 30% on a 5/13 preoperative echocardiogram and 23% on intraoperative transesophageal echo cardiographic study. Subsequent to the 5/13 coronary artery bypass graft surgery there is marked improvement in left ventricular systolic performance.  .  The  1/22  transthoracic echocardiogram demonstrated a normal left ventricular end-diastolic dimension of 4.4 cm and a left ventricular ejection fraction of 67 %   The 2/22 gated sestamibi ejection fraction was 77%  The present cardiac physical examination is indicative of a euvolemic state [place] Heart Association class I. In view of the lack of symptoms, absence of heart failure  and  clinical course continuing  the present medical management is indicated.\par \par I have recommended the following:\par 1 no further cardiac testing for this problem at this time\par 2.Continue the present medical regimen\par  \par \par \par \par \par Hypertension\par Hypertension  has been  controlled on the present medical regimen. In the setting of atherosclerotic  heart disease diabetes and prior left ventricular systolic dysfunction ACE-I and beta blocker therapy are attractive. \par  Aldactone   may be added to the present medical regimen  ( with monitoring of electrolytes and renal function )   if required to maintain optimal blood pressure levels.\par \par I have recommended the following:\par 1 Continue the present medical regimen\par 2 home blood pressure monitoring\par 3 Addition of spironolactone  (with monitoring of electrolytes and renal function)  if required to maintain optimal levels as discussed above\par \par \par \par \par Valvular heart disease\par The  1/22  echocardiogram demonstrated mild  mitral insufficiency and mild aortic sclerosis..The pulmonary artery systolic pressure was normal at 27 mmHg. The left atrial volume index was severely increased at 64 mL/m2. The left ventricular ejection fraction was 67% .The present cardiac physical examination is not suggestive of severe mitral/aortic regurgitation. In view of the lack of symptoms, absence of heart failure and clinical course continuing monitoring without intervention is indicated at this time\par \par I have recommended the following:\par 1 no further cardiac testing for this problem at this time\par  \par \par \par \par Hyperlipidemia \par Hyperlipidemia represents a risk factor for progressive atherosclerotic disease. The target LDL level with known atherosclerotic heart disease is about 70. HMG co-a reductase inhibitor therapy has been effective. In 9/21 the serum cholesterol level was 138  triglycerides 89  HDL 65   and LDL 56. Nonpharmacological therapy, specifically diet and exercise are emphasized as major aspects of treatment\par \par \par I have recommended the following:\par 1 low-fat low-cholesterol diabetic diet. Regular aerobic exercise\par 2 continue present medical regimen\par 3 target LDL level to about 70 as discussed above\par 4. Routine laboratory studies including lipid profile through primary care Dr. Castillo\par \par \par \par The diagnosis, prognosis, risks options alternatives were explained at length to the patient and her family. All questions were answered. Issues discussed included  antihypertensive medications  atherosclerotic heart disease, congestive heart failure hypertension  hyperlipidemia gastroesophageal reflux antihypertensive medications and noninvasive cardiac testing . \par \par  \par \par \par Counseling and/or coordination of care\par Time was a significant factor for this patient encounter. Total time spent with the patient was   25   minutes. 50% of the time was devoted towards counseling and/or coordination of care.

## 2022-08-24 NOTE — HISTORY OF PRESENT ILLNESS
[FreeTextEntry1] : 71-year-old female\par Routine followup\par Blood pressure levels as high as 200/100 mmHg lead to an increase dose of amlodipine from 5 mg/day to 5 mg b.i.d. There has been a marked reduction in blood pressure levels which are now normal ( see home blood pressure monitoring report)\par Liz denies chest pain, shortness of breath ankle edema palpitations or syncope.\par Mrs. Carty is accompanied today by her

## 2022-12-23 RX ORDER — AMLODIPINE BESYLATE 5 MG/1
5 TABLET ORAL TWICE DAILY
Qty: 180 | Refills: 3 | Status: ACTIVE | COMMUNITY
Start: 2022-12-23 | End: 1900-01-01

## 2023-03-08 ENCOUNTER — APPOINTMENT (OUTPATIENT)
Dept: CARDIOLOGY | Facility: CLINIC | Age: 73
End: 2023-03-08
Payer: MEDICARE

## 2023-03-08 ENCOUNTER — NON-APPOINTMENT (OUTPATIENT)
Age: 73
End: 2023-03-08

## 2023-03-08 VITALS
SYSTOLIC BLOOD PRESSURE: 145 MMHG | HEART RATE: 67 BPM | BODY MASS INDEX: 26.06 KG/M2 | HEIGHT: 61 IN | OXYGEN SATURATION: 93 % | WEIGHT: 138 LBS | DIASTOLIC BLOOD PRESSURE: 62 MMHG

## 2023-03-08 DIAGNOSIS — Z87.828 PERSONAL HISTORY OF OTHER (HEALED) PHYSICAL INJURY AND TRAUMA: ICD-10-CM

## 2023-03-08 PROCEDURE — 93000 ELECTROCARDIOGRAM COMPLETE: CPT

## 2023-03-08 PROCEDURE — 99213 OFFICE O/P EST LOW 20 MIN: CPT | Mod: 25

## 2023-03-12 ENCOUNTER — NON-APPOINTMENT (OUTPATIENT)
Age: 73
End: 2023-03-12

## 2023-03-12 PROBLEM — Z87.828 HISTORY OF TRAUMA: Status: RESOLVED | Noted: 2023-03-12 | Resolved: 2023-03-12

## 2023-03-12 NOTE — REVIEW OF SYSTEMS
[Feeling Fatigued] : feeling fatigued [Joint Pain] : joint pain [Negative] : Heme/Lymph [FreeTextEntry5] : See history of present illness

## 2023-03-12 NOTE — DISCUSSION/SUMMARY
[FreeTextEntry1] : Atherosclerotic heart disease\par Atherosclerotic heart disease is stable. In 5/13 multivessel disease and depressed left ventricle systolic function lead to coronary artery bypass graft surgery. The operation consisted of the LIMA-LAD and SVG-OM1. The RCA was small and nondominant. The postoperative course was complicated by sternal wound infection requiring multiple debridement procedures and prolonged antibiotic therapy.  . A 2/22 stress sestamibi study was normal. The left ventricular ejection fraction was 77%. The  3/23  resting electrocardiogram  shows   no evidence of myocardial ischemia or infarction\par \par In view of the lack of symptoms above noninvasive studies and clinical course continued medical management is indicated at this time. Measures to control modifiable risk factors for the development of atherosclerotic disease will be important in long-term management..\par \par I have recommended the following:\par 1 Risk factor modification\par 2. Continue the present medical regimen\par 3. 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 In 5/13  Liz  presented with congestive heart failure/pleural effusions. Severe left ventricular systolic dysfunction was reflected by a left ventricular ejection fraction of 30% on a 5/13 preoperative echocardiogram and 23% on intraoperative transesophageal echo cardiographic study. Subsequent to the 5/13 coronary artery bypass graft surgery there is marked improvement in left ventricular systolic performance.  .  The  1/22  transthoracic echocardiogram demonstrated a normal left ventricular end-diastolic dimension of 4.4 cm and a left ventricular ejection fraction of 67 %   The 2/22 gated sestamibi ejection fraction was 77%  The present cardiac physical examination is indicative of a euvolemic state [place] Heart Association class I. In view of the lack of symptoms, absence of heart failure  and  clinical course continuing  the present medical management is indicated.\par \par I have recommended the following:\par 1 no further cardiac testing for this problem at this time\par 2.Continue the present medical regimen\par  \par \par \par \par \par Hypertension\par Hypertension  has been  controlled on the present medical regimen. In the setting of atherosclerotic  heart disease diabetes and prior left ventricular systolic dysfunction ACE-I and beta blocker therapy are attractive. \par  Aldactone   may be added to the present medical regimen  ( with monitoring of electrolytes and renal function )   if required to maintain optimal blood pressure levels.\par \par I have recommended the following:\par 1 Continue the present medical regimen\par 2 home blood pressure monitoring\par 3 Addition of spironolactone  (with monitoring of electrolytes and renal function)  if required to maintain optimal levels as discussed above\par \par \par \par \par Valvular heart disease\par The  1/22  echocardiogram demonstrated mild  mitral insufficiency and mild aortic sclerosis..The pulmonary artery systolic pressure was normal at 27 mmHg. The left atrial volume index was severely increased at 64 mL/m2. The left ventricular ejection fraction was 67% .The present cardiac physical examination is not suggestive of severe mitral/aortic regurgitation. In view of the lack of symptoms, absence of heart failure and clinical course continuing monitoring without intervention is indicated at this time\par \par I have recommended the following:\par 1 no further cardiac testing for this problem at this time\par  \par \par \par \par Hyperlipidemia \par Hyperlipidemia represents a risk factor for progressive atherosclerotic disease. The target LDL level with known atherosclerotic heart disease is about 70. HMG co-a reductase inhibitor therapy has been effective. In 4/22 the serum cholesterol level was 132 triglycerides 62 HDL 61 and LDL 58.  . Nonpharmacological therapy, specifically diet and exercise are emphasized as major aspects of treatment\par \par \par I have recommended the following:\par 1 low-fat low-cholesterol diabetic diet. Regular aerobic exercise\par 2 continue present medical regimen\par 3 target LDL level to about 70 as discussed above\par 4. Routine laboratory studies including lipid profile through primary care Dr. Castillo\par \par \par \par The diagnosis, prognosis, risks options alternatives were explained at length to the patient and her family. All questions were answered. Issues discussed included  antihypertensive medications  atherosclerotic heart disease, congestive heart failure hypertension  hyperlipidemia gastroesophageal reflux antihypertensive medications and noninvasive cardiac testing . \par \par  \par \par \par Counseling and/or coordination of care\par Time was a significant factor for this patient encounter. Total time spent with the patient was   25   minutes. 50% of the time was devoted towards counseling and/or coordination of care.
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
## 2023-03-12 NOTE — PHYSICAL EXAM
[Normal Conjunctiva] : the conjunctiva exhibited no abnormalities [Auscultation Breath Sounds / Voice Sounds] : lungs were clear to auscultation bilaterally [Heart Rate And Rhythm] : heart rate and rhythm were normal [Murmurs] : no murmurs present [Heart Sounds] : normal S1 and S2 [Edema] : no peripheral edema present [Bowel Sounds] : normal bowel sounds [Abnormal Walk] : normal gait [Cyanosis, Localized] : no localized cyanosis [] : no rash [Affect] : the affect was normal [FreeTextEntry1] : pleasant

## 2023-10-11 ENCOUNTER — APPOINTMENT (OUTPATIENT)
Dept: CARDIOLOGY | Facility: CLINIC | Age: 73
End: 2023-10-11
Payer: MEDICARE

## 2023-10-11 ENCOUNTER — NON-APPOINTMENT (OUTPATIENT)
Age: 73
End: 2023-10-11

## 2023-10-11 VITALS
OXYGEN SATURATION: 98 % | SYSTOLIC BLOOD PRESSURE: 140 MMHG | BODY MASS INDEX: 26.81 KG/M2 | WEIGHT: 142 LBS | DIASTOLIC BLOOD PRESSURE: 93 MMHG | HEART RATE: 68 BPM | HEIGHT: 61 IN

## 2023-10-11 PROCEDURE — 99213 OFFICE O/P EST LOW 20 MIN: CPT | Mod: 25

## 2023-10-11 PROCEDURE — 93000 ELECTROCARDIOGRAM COMPLETE: CPT

## 2024-04-17 ENCOUNTER — APPOINTMENT (OUTPATIENT)
Dept: CARDIOLOGY | Facility: CLINIC | Age: 74
End: 2024-04-17
Payer: MEDICARE

## 2024-04-17 VITALS
DIASTOLIC BLOOD PRESSURE: 74 MMHG | WEIGHT: 146 LBS | HEIGHT: 61 IN | SYSTOLIC BLOOD PRESSURE: 167 MMHG | BODY MASS INDEX: 27.56 KG/M2 | HEART RATE: 70 BPM | OXYGEN SATURATION: 99 %

## 2024-04-17 DIAGNOSIS — I25.10 ATHEROSCLEROTIC HEART DISEASE OF NATIVE CORONARY ARTERY W/OUT ANGINA PECTORIS: ICD-10-CM

## 2024-04-17 DIAGNOSIS — I42.9 CARDIOMYOPATHY, UNSPECIFIED: ICD-10-CM

## 2024-04-17 DIAGNOSIS — I38 ENDOCARDITIS, VALVE UNSPECIFIED: ICD-10-CM

## 2024-04-17 DIAGNOSIS — E78.5 HYPERLIPIDEMIA, UNSPECIFIED: ICD-10-CM

## 2024-04-17 DIAGNOSIS — I34.0 NONRHEUMATIC MITRAL (VALVE) INSUFFICIENCY: ICD-10-CM

## 2024-04-17 DIAGNOSIS — I10 ESSENTIAL (PRIMARY) HYPERTENSION: ICD-10-CM

## 2024-04-17 PROCEDURE — 99213 OFFICE O/P EST LOW 20 MIN: CPT

## 2024-04-28 PROBLEM — I25.10 CAD (CORONARY ARTERY DISEASE): Status: ACTIVE | Noted: 2018-11-02

## 2024-04-28 PROBLEM — I34.0 MITRAL REGURGITATION: Status: ACTIVE | Noted: 2021-12-27

## 2024-04-28 PROBLEM — I42.9 CARDIOMYOPATHY: Status: ACTIVE | Noted: 2018-12-08

## 2024-04-28 PROBLEM — I38 VALVULAR HEART DISEASE: Status: ACTIVE | Noted: 2018-12-08

## 2024-04-28 PROBLEM — E78.5 HYPERLIPIDEMIA: Status: ACTIVE | Noted: 2018-12-08

## 2024-04-28 PROBLEM — I10 ESSENTIAL HYPERTENSION: Status: ACTIVE | Noted: 2018-04-12

## 2024-04-28 NOTE — HISTORY OF PRESENT ILLNESS
[FreeTextEntry1] : 73-year-old female Routine follow-up for atherosclerotic heart disease hypertension hyperlipidemia  "I feel good."  Liz denies chest pain, shortness of breath, palpitations or syncope.  She is physically active without restriction or limitation.  Liz is accompanied today by her

## 2024-04-28 NOTE — DISCUSSION/SUMMARY
[FreeTextEntry1] : Atherosclerotic heart disease Atherosclerotic heart disease is stable. In  multivessel disease and depressed left ventricle systolic function lead to coronary artery bypass graft surgery. The operation consisted of the LIMA-LAD and SVG-OM1. The RCA was small and nondominant. The postoperative course was complicated by sternal wound infection requiring multiple debridement procedures and prolonged antibiotic therapy.  . A  stress sestamibi study was normal. The left ventricular ejection fraction was 77%. The  10/23  resting electrocardiogram  shows   no evidence of myocardial ischemia or infarction  In view of the lack of symptoms above noninvasive studies and clinical course continued medical management is indicated at this time. Measures to control modifiable risk factors for the development of atherosclerotic disease will be important in long-term management..  I have recommended the followin Risk factor modification 2. Continue the present medical regimen 3. No further cardiac testing for this problem at this time 4 Electrocardiogram with next office evaluation    Cardiomyopathy.  The working diagnosis is a dilated ischemic-diabetic-hypertensive cardiomyopathy with resultant congestive heart failure now resolved. In   Liz  presented with congestive heart failure/pleural effusions. Severe left ventricular systolic dysfunction was reflected by a left ventricular ejection fraction of 30% on a  preoperative echocardiogram and 23% on intraoperative transesophageal echo cardiographic study. Subsequent to the  coronary artery bypass graft surgery there is marked improvement in left ventricular systolic performance.  .  The    transthoracic echocardiogram demonstrated a normal left ventricular end-diastolic dimension of 4.4 cm and a left ventricular ejection fraction of 67 %   The  gated sestamibi ejection fraction was 77%  The present cardiac physical examination is indicative of a euvolemic state New [place] Heart Association class I. In view of the lack of symptoms, absence of heart failure  and  clinical course continuing  the present medical management is indicated.  I have recommended the followin no further cardiac testing for this problem at this time 2.Continue the present medical regimen 3 Anticipate echocardiogram later  or        Hypertension Hypertension  has been  controlled on the present medical regimen. Elevation of the blood pressure on initial examination today (167/74 mmHg) is attributed to a whitecoat effect In the setting of atherosclerotic  heart disease diabetes and prior left ventricular systolic dysfunction ACE-I and beta blocker therapy are attractive.   Aldactone   may be added to the present medical regimen  ( with monitoring of electrolytes and renal function )   if required to maintain optimal blood pressure levels.  I have recommended the followin Continue the present medical regimen 2 home blood pressure monitoring 3 Addition of spironolactone  (with monitoring of electrolytes and renal function)  if required to maintain optimal levels as discussed above     Valvular heart disease  /mitral regurgitation The    echocardiogram demonstrated mild  mitral insufficiency and mild aortic sclerosis..The pulmonary artery systolic pressure was normal at 27 mmHg. The left atrial volume index was severely increased at 64 mL/m2. The left ventricular ejection fraction was 67% .The present cardiac physical examination is not suggestive of severe mitral/aortic regurgitation. In view of the lack of symptoms, absence of heart failure and clinical course continuing monitoring without intervention is indicated at this time  I have recommended the followin no further cardiac testing for this problem at this time 2. Anticipate echocardiogram later in  or       Hyperlipidemia  Hyperlipidemia represents a risk factor for progressive atherosclerotic disease. The target LDL level with known atherosclerotic heart disease is about 70. HMG co-a reductase inhibitor therapy has been effective. In    the serum cholesterol level was 138 triglycerides 89  HDL 55 and LDL 66 .  . Nonpharmacological therapy, specifically diet and exercise are emphasized as major aspects of treatment   I have recommended the followin low-fat low-cholesterol diabetic diet. Regular aerobic exercise 2 continue present medical regimen 3 target LDL level to about 70 as discussed above 4. Routine laboratory studies including lipid profile through primary care Dr. Castillo    The diagnosis, prognosis, risks options alternatives were explained at length to the patient and her family. All questions were answered. Issues discussed included  antihypertensive medications  atherosclerotic heart disease, congestive heart failure hypertension  hyperlipidemia  antihypertensive medications  noninvasive cardiac testing .  diet and exercise      Counseling and/or coordination of care Time was a significant factor for this patient encounter. Total time spent with the patient was   25   minutes. 50% of the time was devoted towards counseling and/or coordination of care.

## 2024-12-24 ENCOUNTER — NON-APPOINTMENT (OUTPATIENT)
Age: 74
End: 2024-12-24

## 2024-12-24 ENCOUNTER — APPOINTMENT (OUTPATIENT)
Dept: CARDIOLOGY | Facility: CLINIC | Age: 74
End: 2024-12-24
Payer: MEDICARE

## 2024-12-24 VITALS
DIASTOLIC BLOOD PRESSURE: 64 MMHG | WEIGHT: 148 LBS | OXYGEN SATURATION: 100 % | SYSTOLIC BLOOD PRESSURE: 143 MMHG | BODY MASS INDEX: 27.94 KG/M2 | HEIGHT: 61 IN | HEART RATE: 63 BPM

## 2024-12-24 DIAGNOSIS — I38 ENDOCARDITIS, VALVE UNSPECIFIED: ICD-10-CM

## 2024-12-24 DIAGNOSIS — E78.5 HYPERLIPIDEMIA, UNSPECIFIED: ICD-10-CM

## 2024-12-24 DIAGNOSIS — I10 ESSENTIAL (PRIMARY) HYPERTENSION: ICD-10-CM

## 2024-12-24 DIAGNOSIS — I25.10 ATHEROSCLEROTIC HEART DISEASE OF NATIVE CORONARY ARTERY W/OUT ANGINA PECTORIS: ICD-10-CM

## 2024-12-24 DIAGNOSIS — I34.0 NONRHEUMATIC MITRAL (VALVE) INSUFFICIENCY: ICD-10-CM

## 2024-12-24 DIAGNOSIS — I42.9 CARDIOMYOPATHY, UNSPECIFIED: ICD-10-CM

## 2024-12-24 PROCEDURE — 99213 OFFICE O/P EST LOW 20 MIN: CPT | Mod: 25

## 2024-12-24 PROCEDURE — 93000 ELECTROCARDIOGRAM COMPLETE: CPT
